# Patient Record
Sex: FEMALE | Race: WHITE | NOT HISPANIC OR LATINO | Employment: FULL TIME | URBAN - METROPOLITAN AREA
[De-identification: names, ages, dates, MRNs, and addresses within clinical notes are randomized per-mention and may not be internally consistent; named-entity substitution may affect disease eponyms.]

---

## 2018-01-04 ENCOUNTER — CONVERSION ENCOUNTER (OUTPATIENT)
Dept: MAMMOGRAPHY | Facility: HOSPITAL | Age: 43
End: 2018-01-04

## 2018-02-01 ENCOUNTER — OFFICE VISIT CONVERTED (OUTPATIENT)
Dept: ORTHOPEDIC SURGERY | Facility: CLINIC | Age: 43
End: 2018-02-01
Attending: ORTHOPAEDIC SURGERY

## 2018-02-12 ENCOUNTER — OFFICE VISIT CONVERTED (OUTPATIENT)
Dept: ORTHOPEDIC SURGERY | Facility: CLINIC | Age: 43
End: 2018-02-12
Attending: ORTHOPAEDIC SURGERY

## 2018-06-11 ENCOUNTER — OFFICE VISIT CONVERTED (OUTPATIENT)
Dept: ORTHOPEDIC SURGERY | Facility: CLINIC | Age: 43
End: 2018-06-11
Attending: ORTHOPAEDIC SURGERY

## 2018-07-23 ENCOUNTER — OFFICE VISIT CONVERTED (OUTPATIENT)
Dept: ORTHOPEDIC SURGERY | Facility: CLINIC | Age: 43
End: 2018-07-23
Attending: ORTHOPAEDIC SURGERY

## 2019-01-11 ENCOUNTER — HOSPITAL ENCOUNTER (OUTPATIENT)
Dept: GENERAL RADIOLOGY | Facility: HOSPITAL | Age: 44
Discharge: HOME | End: 2019-01-11
Attending: OBSTETRICS & GYNECOLOGY

## 2020-01-22 ENCOUNTER — HOSPITAL ENCOUNTER (OUTPATIENT)
Dept: GENERAL RADIOLOGY | Facility: HOSPITAL | Age: 45
Discharge: HOME | End: 2020-01-22
Attending: OBSTETRICS & GYNECOLOGY

## 2021-03-22 ENCOUNTER — HOSPITAL ENCOUNTER (OUTPATIENT)
Dept: GENERAL RADIOLOGY | Facility: HOSPITAL | Age: 46
Discharge: HOME | End: 2021-03-22
Attending: OBSTETRICS & GYNECOLOGY

## 2021-05-16 VITALS — BODY MASS INDEX: 23.86 KG/M2 | HEART RATE: 75 BPM | HEIGHT: 67 IN | WEIGHT: 152 LBS | OXYGEN SATURATION: 98 %

## 2021-05-16 VITALS — BODY MASS INDEX: 23.91 KG/M2 | WEIGHT: 152.37 LBS | OXYGEN SATURATION: 98 % | HEIGHT: 67 IN | HEART RATE: 77 BPM

## 2021-05-16 VITALS — OXYGEN SATURATION: 99 % | HEIGHT: 67 IN | WEIGHT: 152.25 LBS | BODY MASS INDEX: 23.9 KG/M2 | HEART RATE: 60 BPM

## 2021-05-16 VITALS — BODY MASS INDEX: 23.86 KG/M2 | HEIGHT: 67 IN | WEIGHT: 152 LBS | HEART RATE: 93 BPM | OXYGEN SATURATION: 98 %

## 2022-03-01 ENCOUNTER — TELEPHONE (OUTPATIENT)
Dept: OBSTETRICS AND GYNECOLOGY | Facility: CLINIC | Age: 47
End: 2022-03-01

## 2022-03-01 DIAGNOSIS — Z12.31 BREAST CANCER SCREENING BY MAMMOGRAM: Primary | ICD-10-CM

## 2022-04-08 RX ORDER — ESTROGENS, CONJUGATED 0.9 MG
TABLET ORAL
Qty: 30 TABLET | Refills: 1 | Status: SHIPPED | OUTPATIENT
Start: 2022-04-08 | End: 2022-05-24 | Stop reason: SDUPTHER

## 2022-04-08 RX ORDER — VALACYCLOVIR HYDROCHLORIDE 1 G/1
TABLET, FILM COATED ORAL
Qty: 30 TABLET | Refills: 1 | Status: SHIPPED | OUTPATIENT
Start: 2022-04-08 | End: 2022-05-24 | Stop reason: SDUPTHER

## 2022-04-28 ENCOUNTER — HOSPITAL ENCOUNTER (OUTPATIENT)
Dept: MAMMOGRAPHY | Facility: HOSPITAL | Age: 47
Discharge: HOME | End: 2022-04-28
Admitting: OBSTETRICS & GYNECOLOGY

## 2022-04-28 DIAGNOSIS — Z12.31 BREAST CANCER SCREENING BY MAMMOGRAM: ICD-10-CM

## 2022-04-28 PROCEDURE — 77067 SCR MAMMO BI INCL CAD: CPT

## 2022-04-28 PROCEDURE — 77063 BREAST TOMOSYNTHESIS BI: CPT

## 2022-05-23 NOTE — PROGRESS NOTES
"Well Woman Visit    CC: Scheduled annual well gyn visit  Chief Complaint   Patient presents with   • Gynecologic Exam       Myriad intake in the past?: Yes    Previously Qualified? NO Changes in Family Hx since? NO    using HRT, s/p HYST BSO, benign indications uses Premarin 0.9 MG tablet    HPI:   46 y.o. Doing well w premarin, still w occas night sweats, wants to continue.    Pain:  None     MMG 2022 cat I.    PCP: does not have PCP  History: PMHx, Meds, Allergies, PSHx, Social Hx, and POBHx all reviewed and updated.        PHYSICAL EXAM:  /79   Pulse 83   Ht 165.1 cm (65\")   Wt 79.4 kg (175 lb)   BMI 29.12 kg/m²  Not found.  General- NAD, alert and oriented, appropriate  Psych- Normal mood, good memory  Neck- No masses, no thyroid enlargement  CV- Regular rhythm, no murnurs  Resp- CTA to bases, no wheezes  Abdomen- Soft, non distended, non tender, no masses    Breast left-  Bilaterally symmetrical, no masses, non tender, no nipple discharge  Breast right- Bilaterally symmetrical, no masses, non tender, no nipple discharge    External genitalia- Normal female, no lesions  Urethra/meatus- Normal, no masses, non tender  Bladder- Normal, no masses, non tender  Vagina- Normal, mild atrophy, no lesions, no discharge.  Prolapse: No prolapse  Cvx- Absent  Uterus- Absent  Adnexa- Absent, NT, no mass  Anus/Rectum/Perineum- Normal appearance, no mass, good sphincter tone, no hemorrhoids, no prolapse    Lymphatic- No palpable neck, axillary, or groin nodes  Ext- No edema, no cyanosis    Skin- No lesions, no rashes, no acanthosis nigricans      ASSESSMENT and PLAN:    Diagnoses and all orders for this visit:    1. Well woman exam (Primary)  -     CBC (No Diff)  -     Comprehensive Metabolic Panel  -     Hemoglobin A1c  -     Lipid Panel  -     TSH    2. Postmenopausal  -     estrogens, conjugated, (PREMARIN) 0.9 MG tablet; Take 1 tablet by mouth Daily.  Dispense: 90 tablet; Refill: 4    3. 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 Darling, JUST MONITOR EXTERNALLY,  colpo not needed any more, RTO for any noted lesins    4. 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 Herpes   -     valACYclovir (VALTREX) 1000 MG tablet; Take 1 tablet by mouth Daily.  Dispense: 90 tablet; Refill: 4    Preventative:  • BREAST HEALTH- Monthly self breast exam importance and how to reviewed. MMG and/or MRI (prn) reviewed per society guidelines and her individual history. Screen: Already up to date  • CERVICAL CANCER Screening- Reviewed current ASCCP guidelines for screening w and wo cotest HPV, age specific.  Screen: Not medically needed  • COLON CANCER Screening- Reviewed current medical society guidelines and options.  Screen:  Pt will call to schedule  • Importance of WEIGHT MANAGEMENT, nutrition, and exercise reviewed  • BONE HEALTH- Reviewed current medical society guidelines and options for testing, calcium and vit D intake.  Weight bearing exercise.  DEXA: Not medically needed  • VACCINATIONS Recommended: COVID and booster PRN, Flu annually, Tdap [id number].  Importance discussed, risk being unvaccinated reviewed.  Questions answered  • SMOKING STATUS- pt does use nicotine and/or tobacco.  I reviewed importance of avoiding.  Recommend FU w PCP regarding quitting options if unable to quit wo assistance.  Does NOT qualify- age <50.    HRT- I recommend the lowest dose possible, for the shortest period of time.  Risks HRT NLT breast CA (progestin), CVA, MI, SHIREEN.     Importance of monitoring vulva for lesions and FU PRN noting any visible lesions.     She understands the importance of having any ordered tests to be performed in a timely fashion.  The risks of not performing them include, but are not limited to, advanced cancer stages, bone loss from osteoporosis and/or subsequent increase in morbidity and/or  mortality.  She is encouraged to review her results online and/or contact or office if she has questions.     Follow Up:  Return in about 1 year (around 5/24/2023) for WWE.            Liliana Rocha,   05/24/2022    [facility] [facility]  [address] [place] [address]  Dept: [phone]  Dept Fax: [phone]  Loc: [phone]  Loc Fax: [phone]

## 2022-05-24 ENCOUNTER — OFFICE VISIT (OUTPATIENT)
Dept: OBSTETRICS AND GYNECOLOGY | Facility: CLINIC | Age: 47
End: 2022-05-24

## 2022-05-24 VITALS
WEIGHT: 175 LBS | HEART RATE: 83 BPM | BODY MASS INDEX: 29.16 KG/M2 | DIASTOLIC BLOOD PRESSURE: 79 MMHG | SYSTOLIC BLOOD PRESSURE: 132 MMHG | HEIGHT: 65 IN

## 2022-05-24 DIAGNOSIS — D07.1 VIN III (VULVAR INTRAEPITHELIAL NEOPLASIA III): ICD-10-CM

## 2022-05-24 DIAGNOSIS — B00.9 HERPES: ICD-10-CM

## 2022-05-24 DIAGNOSIS — N89.0 VAIN I (VAGINAL INTRAEPITHELIAL NEOPLASIA GRADE I): ICD-10-CM

## 2022-05-24 DIAGNOSIS — Z01.419 WELL WOMAN EXAM: Primary | ICD-10-CM

## 2022-05-24 DIAGNOSIS — Z78.0 POSTMENOPAUSAL: ICD-10-CM

## 2022-05-24 LAB
ALBUMIN SERPL-MCNC: 4.1 G/DL (ref 3.5–5.2)
ALBUMIN/GLOB SERPL: 1.3 G/DL
ALP SERPL-CCNC: 63 U/L (ref 39–117)
ALT SERPL W P-5'-P-CCNC: 10 U/L (ref 1–33)
ANION GAP SERPL CALCULATED.3IONS-SCNC: 11.1 MMOL/L (ref 5–15)
AST SERPL-CCNC: 16 U/L (ref 1–32)
BILIRUB SERPL-MCNC: 0.3 MG/DL (ref 0–1.2)
BUN SERPL-MCNC: 9 MG/DL (ref 6–20)
BUN/CREAT SERPL: 12.7 (ref 7–25)
CALCIUM SPEC-SCNC: 9.5 MG/DL (ref 8.6–10.5)
CHLORIDE SERPL-SCNC: 104 MMOL/L (ref 98–107)
CHOLEST SERPL-MCNC: 218 MG/DL (ref 0–200)
CO2 SERPL-SCNC: 22.9 MMOL/L (ref 22–29)
CREAT SERPL-MCNC: 0.71 MG/DL (ref 0.57–1)
DEPRECATED RDW RBC AUTO: 42.9 FL (ref 37–54)
EGFRCR SERPLBLD CKD-EPI 2021: 105.7 ML/MIN/1.73
ERYTHROCYTE [DISTWIDTH] IN BLOOD BY AUTOMATED COUNT: 12.5 % (ref 12.3–15.4)
GLOBULIN UR ELPH-MCNC: 3.2 GM/DL
GLUCOSE SERPL-MCNC: 102 MG/DL (ref 65–99)
HBA1C MFR BLD: 5.3 % (ref 4.8–5.6)
HCT VFR BLD AUTO: 41.6 % (ref 34–46.6)
HDLC SERPL-MCNC: 60 MG/DL (ref 40–60)
HGB BLD-MCNC: 14.2 G/DL (ref 12–15.9)
LDLC SERPL CALC-MCNC: 130 MG/DL (ref 0–100)
LDLC/HDLC SERPL: 2.1 {RATIO}
MCH RBC QN AUTO: 32.1 PG (ref 26.6–33)
MCHC RBC AUTO-ENTMCNC: 34.1 G/DL (ref 31.5–35.7)
MCV RBC AUTO: 93.9 FL (ref 79–97)
PLATELET # BLD AUTO: 305 10*3/MM3 (ref 140–450)
PMV BLD AUTO: 11.2 FL (ref 6–12)
POTASSIUM SERPL-SCNC: 4 MMOL/L (ref 3.5–5.2)
PROT SERPL-MCNC: 7.3 G/DL (ref 6–8.5)
RBC # BLD AUTO: 4.43 10*6/MM3 (ref 3.77–5.28)
SODIUM SERPL-SCNC: 138 MMOL/L (ref 136–145)
TRIGL SERPL-MCNC: 159 MG/DL (ref 0–150)
TSH SERPL DL<=0.05 MIU/L-ACNC: 3.75 UIU/ML (ref 0.27–4.2)
VLDLC SERPL-MCNC: 28 MG/DL (ref 5–40)
WBC NRBC COR # BLD: 12.13 10*3/MM3 (ref 3.4–10.8)

## 2022-05-24 PROCEDURE — 83036 HEMOGLOBIN GLYCOSYLATED A1C: CPT | Performed by: OBSTETRICS & GYNECOLOGY

## 2022-05-24 PROCEDURE — 80050 GENERAL HEALTH PANEL: CPT | Performed by: OBSTETRICS & GYNECOLOGY

## 2022-05-24 PROCEDURE — 80061 LIPID PANEL: CPT | Performed by: OBSTETRICS & GYNECOLOGY

## 2022-05-24 PROCEDURE — 99396 PREV VISIT EST AGE 40-64: CPT | Performed by: OBSTETRICS & GYNECOLOGY

## 2022-05-24 RX ORDER — VALACYCLOVIR HYDROCHLORIDE 1 G/1
1000 TABLET, FILM COATED ORAL DAILY
Qty: 90 TABLET | Refills: 4 | Status: SHIPPED | OUTPATIENT
Start: 2022-05-24 | End: 2022-06-01

## 2022-05-24 RX ORDER — CLONAZEPAM 1 MG/1
1 TABLET ORAL
COMMUNITY

## 2022-05-24 RX ORDER — VALACYCLOVIR HYDROCHLORIDE 1 G/1
TABLET, FILM COATED ORAL
COMMUNITY
End: 2022-05-24 | Stop reason: SDUPTHER

## 2022-06-01 DIAGNOSIS — Z78.0 POSTMENOPAUSAL: ICD-10-CM

## 2022-06-01 RX ORDER — VALACYCLOVIR HYDROCHLORIDE 1 G/1
TABLET, FILM COATED ORAL
Qty: 90 TABLET | Refills: 4 | Status: SHIPPED | OUTPATIENT
Start: 2022-06-01

## 2023-05-02 ENCOUNTER — TRANSCRIBE ORDERS (OUTPATIENT)
Dept: ADMINISTRATIVE | Facility: HOSPITAL | Age: 48
End: 2023-05-02
Payer: COMMERCIAL

## 2023-05-02 DIAGNOSIS — Z12.31 SCREENING MAMMOGRAM FOR BREAST CANCER: Primary | ICD-10-CM

## 2023-05-26 NOTE — PROGRESS NOTES
"Well Woman Visit    CC: Scheduled annual well gyn visit  Chief Complaint   Patient presents with   • Gynecologic Exam       Prior Myriad or Hereditary Cancer testing (attach copy of result if yes): no, did not qualify     HPI:   47 y.o.   Social History     Substance and Sexual Activity   Sexual Activity Yes   • Partners: Male   • Birth control/protection: Hysterectomy     Prior VARGAS/BSO , Dr. Cuevas for benign indications.  No Pap smears needed.  History of VAIN 1-no Pap smears needed per GYN oncology.  History of VERN 2-3 status post partial vulvectomy, GYN ONC.  Has completed colposcopic follow-up.  Follow-up only as needed any noted lesions.      Pt has not noted any vulvar lesions.  No vag dc, itch or odor.  No VB.    Has mammogram scheduled in     , occas night sweats.  Taking premarin 0.9, doing well wants to continue.    Leak urine w cough sneeze.  No constant dribble.  A lot of urinary urgency w leak also.  Sylvie 4am.  Nocturia 1-2x.  No dysuria. No vag dryness.    PCP: does manage PMHx and preventative labs  History: PMHx, Meds, Allergies, PSHx, Social Hx, and POBHx all reviewed and updated.    PHYSICAL EXAM:  /78   Pulse 80   Ht 165.1 cm (65\")   Wt 78.7 kg (173 lb 6.4 oz)   BMI 28.86 kg/m²  Not found.  General- NAD, alert and oriented, appropriate  Psych- Normal mood, good memory  Neck- No masses, no thyroid enlargement  CV- Regular rhythm, no murnurs  Resp- CTA to bases, no wheezes  Abdomen- Soft, non distended, non tender, no masses    Breast left-  Bilaterally symmetrical, no masses, non tender, no nipple discharge, no axillary or supraclavicular nodes palpable  Breast right- Bilaterally symmetrical, no masses, non tender, no nipple discharge, no axillary or supraclavicular nodes palpable    External genitalia- Normal female, no lesions  Urethra/meatus- Normal, no masses, non tender  Bladder- Normal, no masses, non tender  Vagina- Normal, no atrophy, no lesions, no discharge. "  Prolapse: Split speculum exam performed and tested with valsalva, Cystocele, Rectocele, Paravaginal defect: Cystocele to introitus, bilat equal paravag defects, rectocele very mild  Cvx- Absent  Uterus- Absent  Adnexa- Absent  Anus/Rectum/Perineum- Normal appearance, no mass, good sphincter tone, no hemorrhoids, no prolapse    Lymphatic- No palpable neck, axillary, or groin nodes  Ext- No edema, no cyanosis    Skin- lesion chest 4mm slightly pearly margins, no rashes, no acanthosis nigricans      ASSESSMENT and PLAN:    Diagnoses and all orders for this visit:    1. Well woman exam (Primary)  -     Cancel: IgP, Aptima HPV    2. Postmenopausal  -     estrogens, conjugated, (PREMARIN) 0.9 MG tablet; Take 1 tablet by mouth Daily.  Dispense: 90 tablet; Refill: 4    3. Screen for colon cancer  -     Ambulatory Referral to General Surgery    4. Hx of Herpes  -     valACYclovir (VALTREX) 1000 MG tablet; Take 1 tablet by mouth Daily.  Dispense: 90 tablet; Refill: 4    5. 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 Hx VERN II-III (vulvar intraepithelial neoplasia III)  Overview:  Partial vulvectomy, GYN ONC, Dr. Darling, JUST MONITOR EXTERNALLY,  colpo not needed any more, RTO for any noted lesins      7. Urinary urgency  -     Urinalysis With Microscopic - Urine, Clean Catch  -     Urine Culture - Urine, Urine, Clean Catch  -     tolterodine LA (Detrol LA) 2 MG 24 hr capsule; Take 1 capsule by mouth Daily.  Dispense: 30 capsule; Refill: 12    8. Mixed stress and urge urinary incontinence  Comments:  Discussed all options, declines pessary or UROGYN, will start kegels  Overview:  w cystocele/ paravag defects    Orders:  -     tolterodine LA (Detrol LA) 2 MG 24 hr capsule; Take 1 capsule by mouth Daily.  Dispense: 30 capsule; Refill: 12    9. Cystocele, paravag defects, rectocele    10. Skin lesion  Comments:  discussd DDX includes malignancy, rec  eval PCP and strongly consider derm eval/biopsy.  Pt will dw her PCP.        Preventative:  • BREAST HEALTH- Monthly self breast exam importance and how to reviewed. MMG and/or MRI (prn) reviewed per society guidelines and her individual history. Screen: Already up to date  • CERVICAL CANCER Screening- Reviewed current ASCCP guidelines for screening w and wo cotest HPV, age specific.  Screen: Not medically needed  • COLON CANCER Screening- Reviewed current medical society guidelines and options.  Screen:  Updated today  • Importance of WEIGHT MANAGEMENT, nutrition, and exercise reviewed  • BONE HEALTH- Reviewed current medical society guidelines and options for testing, calcium and vit D intake.  Weight bearing exercise.  DEXA: Not medically needed  • VACCINATIONS Recommended: COVID and booster PRN, Flu annually, Tdap [id number].  Importance discussed, risk being unvaccinated reviewed.  Questions answered  • SMOKING STATUS- pt does use nicotine and/or tobacco.  I reviewed importance of avoiding.  Recommend FU w PCP regarding quitting options if unable to quit wo assistance.  Does not qualify (age 50-80, 20pack yr hx, current or former smoker, quit w/in last 15yrs, no symptoms of lung CA) for low dose CT of chest.  If qualifies, I recommend pt FU w PCP to schedule/monitor screening for lung CA.  We discussed smoking and risk vag/vulvar malignancy.  Pt is not ready to quit, but does plan on working w PCP to assist with quitting in the future.  • Follow up PCP/Specialist PMHx and Labs    HRT- I recommend the lowest dose possible, for the shortest period of time.  Risks HRT NLT breast CA (progestin), CVA, MI, SHIREEN.      She understands the importance of having any ordered tests to be performed in a timely fashion.  The risks of not performing them include, but are not limited to, advanced cancer stages, bone loss from osteoporosis and/or subsequent increase in morbidity and/or mortality.  She is encouraged to review her  results online and/or contact or office if she has questions.     Follow Up:  Return in about 1 year (around 5/30/2024) for WWE sooner prn desiring pessary or other options for CHINEDU.            Liliana Rocha, DO  05/30/2023    [facility] [place] [facility]  [address] [place] [address]  Dept: [phone]  Dept Fax: [phone]  Loc: [phone]  Loc Fax: [phone]

## 2023-05-30 ENCOUNTER — OFFICE VISIT (OUTPATIENT)
Dept: OBSTETRICS AND GYNECOLOGY | Facility: CLINIC | Age: 48
End: 2023-05-30

## 2023-05-30 VITALS
BODY MASS INDEX: 28.89 KG/M2 | HEIGHT: 65 IN | SYSTOLIC BLOOD PRESSURE: 121 MMHG | WEIGHT: 173.4 LBS | HEART RATE: 80 BPM | DIASTOLIC BLOOD PRESSURE: 78 MMHG

## 2023-05-30 DIAGNOSIS — N39.46 MIXED STRESS AND URGE URINARY INCONTINENCE: ICD-10-CM

## 2023-05-30 DIAGNOSIS — N89.0 VAIN I (VAGINAL INTRAEPITHELIAL NEOPLASIA GRADE I): ICD-10-CM

## 2023-05-30 DIAGNOSIS — R39.15 URINARY URGENCY: ICD-10-CM

## 2023-05-30 DIAGNOSIS — L98.9 SKIN LESION: ICD-10-CM

## 2023-05-30 DIAGNOSIS — Z01.419 WELL WOMAN EXAM: Primary | ICD-10-CM

## 2023-05-30 DIAGNOSIS — B00.9 HERPES: ICD-10-CM

## 2023-05-30 DIAGNOSIS — Z78.0 POSTMENOPAUSAL: ICD-10-CM

## 2023-05-30 DIAGNOSIS — D07.1 VIN III (VULVAR INTRAEPITHELIAL NEOPLASIA III): ICD-10-CM

## 2023-05-30 DIAGNOSIS — Z12.11 SCREEN FOR COLON CANCER: ICD-10-CM

## 2023-05-30 DIAGNOSIS — N81.4 CYSTOCELE WITH PROLAPSE: ICD-10-CM

## 2023-05-30 LAB
BACTERIA UR QL AUTO: NORMAL /HPF
BILIRUB UR QL STRIP: NEGATIVE
CLARITY UR: CLEAR
COLOR UR: YELLOW
GLUCOSE UR STRIP-MCNC: NEGATIVE MG/DL
HGB UR QL STRIP.AUTO: NEGATIVE
HYALINE CASTS UR QL AUTO: NORMAL /LPF
KETONES UR QL STRIP: NEGATIVE
LEUKOCYTE ESTERASE UR QL STRIP.AUTO: NEGATIVE
NITRITE UR QL STRIP: NEGATIVE
PH UR STRIP.AUTO: 6.5 [PH] (ref 5–8)
PROT UR QL STRIP: NEGATIVE
RBC # UR STRIP: NORMAL /HPF
REF LAB TEST METHOD: NORMAL
SP GR UR STRIP: 1.01 (ref 1–1.03)
SQUAMOUS #/AREA URNS HPF: NORMAL /HPF
UROBILINOGEN UR QL STRIP: NORMAL
WBC # UR STRIP: NORMAL /HPF

## 2023-05-30 PROCEDURE — 81001 URINALYSIS AUTO W/SCOPE: CPT | Performed by: OBSTETRICS & GYNECOLOGY

## 2023-05-30 PROCEDURE — 87086 URINE CULTURE/COLONY COUNT: CPT | Performed by: OBSTETRICS & GYNECOLOGY

## 2023-05-30 RX ORDER — VALACYCLOVIR HYDROCHLORIDE 1 G/1
1000 TABLET, FILM COATED ORAL DAILY
Qty: 90 TABLET | Refills: 4 | Status: SHIPPED | OUTPATIENT
Start: 2023-05-30

## 2023-05-30 RX ORDER — TOLTERODINE 2 MG/1
2 CAPSULE, EXTENDED RELEASE ORAL DAILY
Qty: 30 CAPSULE | Refills: 12 | Status: SHIPPED | OUTPATIENT
Start: 2023-05-30

## 2023-05-31 DIAGNOSIS — Z78.0 POSTMENOPAUSAL: ICD-10-CM

## 2023-05-31 LAB — BACTERIA SPEC AEROBE CULT: NO GROWTH

## 2023-05-31 RX ORDER — ESTROGENS, CONJUGATED 0.9 MG
TABLET ORAL
Qty: 90 TABLET | Refills: 4 | OUTPATIENT
Start: 2023-05-31

## 2023-05-31 NOTE — TELEPHONE ENCOUNTER
Denied refill on Premarin.  Last seen 5/30/23.  Last filled 5/30/23 Premarin #90 with 4 refills.  Next appointment 5/31/24

## 2023-06-01 DIAGNOSIS — B00.9 HERPES: ICD-10-CM

## 2023-06-01 RX ORDER — VALACYCLOVIR HYDROCHLORIDE 1 G/1
TABLET, FILM COATED ORAL
Qty: 30 TABLET | OUTPATIENT
Start: 2023-06-01

## 2023-06-05 ENCOUNTER — HOSPITAL ENCOUNTER (OUTPATIENT)
Dept: MAMMOGRAPHY | Facility: HOSPITAL | Age: 48
Discharge: HOME | End: 2023-06-05
Admitting: OBSTETRICS & GYNECOLOGY
Payer: COMMERCIAL

## 2023-06-05 DIAGNOSIS — Z12.31 SCREENING MAMMOGRAM FOR BREAST CANCER: ICD-10-CM

## 2023-06-05 PROCEDURE — 77067 SCR MAMMO BI INCL CAD: CPT

## 2023-06-05 PROCEDURE — 77063 BREAST TOMOSYNTHESIS BI: CPT

## 2023-11-27 ENCOUNTER — PREP FOR SURGERY (OUTPATIENT)
Dept: OTHER | Facility: HOSPITAL | Age: 48
End: 2023-11-27
Payer: COMMERCIAL

## 2023-11-27 ENCOUNTER — OFFICE VISIT (OUTPATIENT)
Dept: SURGERY | Facility: CLINIC | Age: 48
End: 2023-11-27
Payer: COMMERCIAL

## 2023-11-27 VITALS
SYSTOLIC BLOOD PRESSURE: 131 MMHG | WEIGHT: 172 LBS | BODY MASS INDEX: 28.66 KG/M2 | HEART RATE: 82 BPM | HEIGHT: 65 IN | DIASTOLIC BLOOD PRESSURE: 80 MMHG

## 2023-11-27 DIAGNOSIS — R10.12 LUQ PAIN: Primary | ICD-10-CM

## 2023-11-27 DIAGNOSIS — K59.09 OTHER CONSTIPATION: ICD-10-CM

## 2023-11-27 DIAGNOSIS — K59.00 CONSTIPATION, UNSPECIFIED CONSTIPATION TYPE: ICD-10-CM

## 2023-11-27 PROCEDURE — 99203 OFFICE O/P NEW LOW 30 MIN: CPT | Performed by: NURSE PRACTITIONER

## 2023-11-27 RX ORDER — SODIUM CHLORIDE 9 MG/ML
40 INJECTION, SOLUTION INTRAVENOUS AS NEEDED
Status: CANCELLED | OUTPATIENT
Start: 2023-11-27

## 2023-11-27 RX ORDER — SODIUM CHLORIDE 0.9 % (FLUSH) 0.9 %
3 SYRINGE (ML) INJECTION EVERY 12 HOURS SCHEDULED
Status: CANCELLED | OUTPATIENT
Start: 2023-11-27

## 2023-11-27 RX ORDER — SODIUM CHLORIDE 0.9 % (FLUSH) 0.9 %
10 SYRINGE (ML) INJECTION AS NEEDED
Status: CANCELLED | OUTPATIENT
Start: 2023-11-27

## 2023-11-27 RX ORDER — DOCUSATE SODIUM 100 MG/1
CAPSULE, LIQUID FILLED ORAL
COMMUNITY
Start: 1995-06-24

## 2023-11-27 RX ORDER — SODIUM, POTASSIUM,MAG SULFATES 17.5-3.13G
SOLUTION, RECONSTITUTED, ORAL ORAL
Qty: 354 ML | Refills: 0 | Status: ON HOLD | OUTPATIENT
Start: 2023-11-27 | End: 2023-11-29

## 2023-11-27 RX ORDER — IBUPROFEN 800 MG/1
800 TABLET ORAL DAILY
COMMUNITY

## 2023-11-27 NOTE — H&P (VIEW-ONLY)
Chief Complaint: Colonoscopy (consult)    Subjective      Colonoscopy consultation       History of Present Illness  Matt Steele is a 48 y.o. female presents to [facility] for colonoscopy consultation.    Patient presents today on referral from Dr. Liliana Bright for colonoscopy consultation.  Patient reports that she is having left upper quadrant pain intermittently x 2 years.  The patient reports that the pain has increased over the last year.  She does admit to constipation issues.  She reports that she has had constipation issues for years.  Denies any rectal bleeding.  Denies any change in bowel habit.  Denies any family history of colorectal cancer.  No previous colonoscopy.    Patient denies YUE.  Denies any cardiac issues.  Denies taking a GLP-1 receptors.    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 Darling, JUST MONITOR EXTERNALLY,  colpo not needed any more, RTO for any noted lesins       Past Surgical History:   Procedure Laterality Date    HYSTERECTOMY  2002    [facility] BSO, Dr. Cuevas, benign indications    SALPINGECTOMY      TONSILLECTOMY      TUBAL ABDOMINAL LIGATION      VULVA SURGERY Left     Partial vulvectomy, GYN ONC, Dr. Darling    WISDOM TOOTH EXTRACTION         Outpatient Medications Marked as Taking for the 11/27/23 encounter (Office Visit) with Corinne Naylor APRN   Medication Sig Dispense Refill    docusate sodium (Stool Softener) 100 MG capsule       estrogens, conjugated, (PREMARIN) 0.9 MG tablet Take 1 tablet by mouth Daily. 90 tablet 4    ibuprofen (ADVIL,MOTRIN) 800 MG tablet Take 1 tablet by mouth Daily.      Probiotic Product (PROBIOTIC PO) Take  by mouth Daily.      tolterodine LA (Detrol LA) 2 MG 24 hr  "capsule Take 1 capsule by mouth Daily. 30 capsule 12    valACYclovir (VALTREX) 1000 MG tablet Take 1 tablet by mouth Daily. 90 tablet 4       No Known Allergies     Family History   Problem Relation Age of Onset    Lung cancer Mother     Lung cancer Maternal Grandmother     Liver cancer Maternal Grandmother     Breast cancer Neg Hx     Ovarian cancer Neg Hx     Uterine cancer Neg Hx     Colon cancer Neg Hx     Deep vein thrombosis Neg Hx     Pulmonary embolism Neg Hx        Social History     Socioeconomic History    Marital status:    Tobacco Use    Smoking status: Every Day     Packs/day: 1.50     Years: 25.00     Additional pack years: 0.00     Total pack years: 37.50     Types: Cigarettes    Smokeless tobacco: Never   Vaping Use    Vaping Use: Never used   Substance and Sexual Activity    Alcohol use: Yes     Comment: very rarely    Drug use: Never    Sexual activity: Yes     Partners: Male     Birth control/protection: Hysterectomy       Review of Systems   Constitutional:  Negative for chills and fever.   Gastrointestinal:  Positive for abdominal pain and constipation. Negative for abdominal distention, anal bleeding, blood in stool, diarrhea and rectal pain.        Vital Signs:   /80 (BP Location: Right arm)   Pulse 82   Ht 165.1 cm (65\")   Wt 78 kg (172 lb)   BMI 28.62 kg/m²      Physical Exam  Vitals and nursing note reviewed.   Constitutional:       General: She is not in acute distress.     Appearance: Normal appearance.   HENT:      Head: Normocephalic.   Cardiovascular:      Rate and Rhythm: Normal rate.   Pulmonary:      Effort: Pulmonary effort is normal.      Breath sounds: No stridor.   Abdominal:      Palpations: Abdomen is soft.      Tenderness: There is abdominal tenderness. There is no guarding.   Musculoskeletal:         General: No deformity. Normal range of motion.   Skin:     General: Skin is warm and dry.      Coloration: Skin is not jaundiced.   Neurological:      General: " No focal deficit present.      Mental Status: She is alert and oriented to person, place, and time.   Psychiatric:         Mood and Affect: Mood normal.         Thought Content: Thought content normal.          Result Review :          []  Laboratory  []  Radiology  []  Pathology  []  Microbiology  []  EKG/Telemetry   []  Cardiology/Vascular   []  Old records  I spent 15 minutes caring for Matt on this date of service. This time includes time spent by me in the following activities: reviewing tests, obtaining and/or reviewing a separately obtained history, performing a medically appropriate examination and/or evaluation, ordering medications, tests, or procedures, and documenting information in the medical record.     Assessment and Plan    Diagnoses and all orders for this visit:    1. LUQ pain (Primary)    2. Constipation, unspecified constipation type    Other orders  -     sodium-potassium-magnesium sulfates (Suprep Bowel Prep Kit) 17.5-3.13-1.6 GM/177ML solution oral solution; Take as directed.  Instructions given in office.  Dispense: 2 bottles  Dispense: 354 mL; Refill: 0    N.p.o. 2 hours prior to the procedure      Follow Up   Return for Schedule colonoscopy with Dr. Valdes on 11/29/2023 at [facility].    Hospital arrival time: 10:30.    Possible risks/complications, benefits, and alternatives to surgical or invasive procedures have been explained to patient and/or legal guardian.    Patient has been evaluated and can tolerate anesthesia and/or sedation. Risks, benefits, and alternatives to anesthesia and sedation have been explained to the patient and/or legal guardian. Patient verbalizes understanding and is willing to proceed with the above plan.     Patient was given instructions and counseling regarding her condition or for health maintenance advice. Please see specific information pulled into the AVS if appropriate.

## 2023-11-27 NOTE — PROGRESS NOTES
Chief Complaint: Colonoscopy (consult)    Subjective      Colonoscopy consultation       History of Present Illness  Matt Steele is a 48 y.o. female presents to [facility] for colonoscopy consultation.    Patient presents today on referral from Dr. Liliana Bright for colonoscopy consultation.  Patient reports that she is having left upper quadrant pain intermittently x 2 years.  The patient reports that the pain has increased over the last year.  She does admit to constipation issues.  She reports that she has had constipation issues for years.  Denies any rectal bleeding.  Denies any change in bowel habit.  Denies any family history of colorectal cancer.  No previous colonoscopy.    Patient denies YUE.  Denies any cardiac issues.  Denies taking a GLP-1 receptors.    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 Darilng, JUST MONITOR EXTERNALLY,  colpo not needed any more, RTO for any noted lesins       Past Surgical History:   Procedure Laterality Date    HYSTERECTOMY  2002    [facility] BSO, Dr. Cuevas, benign indications    SALPINGECTOMY      TONSILLECTOMY      TUBAL ABDOMINAL LIGATION      VULVA SURGERY Left     Partial vulvectomy, GYN ONC, Dr. Darling    WISDOM TOOTH EXTRACTION         Outpatient Medications Marked as Taking for the 11/27/23 encounter (Office Visit) with Corinne Naylor APRN   Medication Sig Dispense Refill    docusate sodium (Stool Softener) 100 MG capsule       estrogens, conjugated, (PREMARIN) 0.9 MG tablet Take 1 tablet by mouth Daily. 90 tablet 4    ibuprofen (ADVIL,MOTRIN) 800 MG tablet Take 1 tablet by mouth Daily.      Probiotic Product (PROBIOTIC PO) Take  by mouth Daily.      tolterodine LA (Detrol LA) 2 MG 24 hr  "capsule Take 1 capsule by mouth Daily. 30 capsule 12    valACYclovir (VALTREX) 1000 MG tablet Take 1 tablet by mouth Daily. 90 tablet 4       No Known Allergies     Family History   Problem Relation Age of Onset    Lung cancer Mother     Lung cancer Maternal Grandmother     Liver cancer Maternal Grandmother     Breast cancer Neg Hx     Ovarian cancer Neg Hx     Uterine cancer Neg Hx     Colon cancer Neg Hx     Deep vein thrombosis Neg Hx     Pulmonary embolism Neg Hx        Social History     Socioeconomic History    Marital status:    Tobacco Use    Smoking status: Every Day     Packs/day: 1.50     Years: 25.00     Additional pack years: 0.00     Total pack years: 37.50     Types: Cigarettes    Smokeless tobacco: Never   Vaping Use    Vaping Use: Never used   Substance and Sexual Activity    Alcohol use: Yes     Comment: very rarely    Drug use: Never    Sexual activity: Yes     Partners: Male     Birth control/protection: Hysterectomy       Review of Systems   Constitutional:  Negative for chills and fever.   Gastrointestinal:  Positive for abdominal pain and constipation. Negative for abdominal distention, anal bleeding, blood in stool, diarrhea and rectal pain.        Vital Signs:   /80 (BP Location: Right arm)   Pulse 82   Ht 165.1 cm (65\")   Wt 78 kg (172 lb)   BMI 28.62 kg/m²      Physical Exam  Vitals and nursing note reviewed.   Constitutional:       General: She is not in acute distress.     Appearance: Normal appearance.   HENT:      Head: Normocephalic.   Cardiovascular:      Rate and Rhythm: Normal rate.   Pulmonary:      Effort: Pulmonary effort is normal.      Breath sounds: No stridor.   Abdominal:      Palpations: Abdomen is soft.      Tenderness: There is abdominal tenderness. There is no guarding.   Musculoskeletal:         General: No deformity. Normal range of motion.   Skin:     General: Skin is warm and dry.      Coloration: Skin is not jaundiced.   Neurological:      General: " No focal deficit present.      Mental Status: She is alert and oriented to person, place, and time.   Psychiatric:         Mood and Affect: Mood normal.         Thought Content: Thought content normal.          Result Review :          []  Laboratory  []  Radiology  []  Pathology  []  Microbiology  []  EKG/Telemetry   []  Cardiology/Vascular   []  Old records  I spent 15 minutes caring for Matt on this date of service. This time includes time spent by me in the following activities: reviewing tests, obtaining and/or reviewing a separately obtained history, performing a medically appropriate examination and/or evaluation, ordering medications, tests, or procedures, and documenting information in the medical record.     Assessment and Plan    Diagnoses and all orders for this visit:    1. LUQ pain (Primary)    2. Constipation, unspecified constipation type    Other orders  -     sodium-potassium-magnesium sulfates (Suprep Bowel Prep Kit) 17.5-3.13-1.6 GM/177ML solution oral solution; Take as directed.  Instructions given in office.  Dispense: 2 bottles  Dispense: 354 mL; Refill: 0    N.p.o. 2 hours prior to the procedure      Follow Up   Return for Schedule colonoscopy with Dr. Valdes on 11/29/2023 at [facility].    Hospital arrival time: 10:30.    Possible risks/complications, benefits, and alternatives to surgical or invasive procedures have been explained to patient and/or legal guardian.    Patient has been evaluated and can tolerate anesthesia and/or sedation. Risks, benefits, and alternatives to anesthesia and sedation have been explained to the patient and/or legal guardian. Patient verbalizes understanding and is willing to proceed with the above plan.     Patient was given instructions and counseling regarding her condition or for health maintenance advice. Please see specific information pulled into the AVS if appropriate.

## 2023-11-28 ENCOUNTER — ANESTHESIA EVENT (OUTPATIENT)
Dept: GASTROENTEROLOGY | Facility: HOSPITAL | Age: 48
End: 2023-11-28
Payer: COMMERCIAL

## 2023-11-28 NOTE — ANESTHESIA PREPROCEDURE EVALUATION
Anesthesia Evaluation     Patient summary reviewed and Nursing notes reviewed   NPO Solid Status: > 8 hours  NPO Liquid Status: > 2 hours           Airway   Mallampati: II  TM distance: >3 FB  Neck ROM: full  No difficulty expected  Dental    (+) lower dentures, upper dentures and edentulous    Pulmonary - normal exam    breath sounds clear to auscultation  (+) a smoker Current, cigarettes,  Cardiovascular - negative cardio ROS and normal exam  Exercise tolerance: excellent (>7 METS)    Rhythm: regular  Rate: normal        Neuro/Psych  (+) psychiatric history Anxiety  GI/Hepatic/Renal/Endo - negative ROS     Musculoskeletal (-) negative ROS    Abdominal  - normal exam    Abdomen: soft.  Bowel sounds: normal.   Substance History      OB/GYN negative ob/gyn ROS   (-)  Pregnant        Other        ROS/Med Hx Other: Hysterectomy                Anesthesia Plan    ASA 2     MAC   total IV anesthesia  intravenous induction     Anesthetic plan, risks, benefits, and alternatives have been provided, discussed and informed consent has been obtained with: patient.    Use of blood products discussed with patient .    Plan discussed with CRNA.      CODE STATUS:

## 2023-11-28 NOTE — PRE-PROCEDURE INSTRUCTIONS
"Instructed on date and arrival time of 1000. Come to entrance \"C\". Must have  over age 18 to drive home.  May have two visitors; however, children under 12 must stay in waiting room.  Discussed clear liquid diet (no red or purple), bowel prep, and NPO.  May take medications as usual except for blood thinners, diabetic medications, and weight loss medications.  Bring list of medications.  Verbalized understanding of instructions given.  Instructed to call for questions or concerns.  "

## 2023-11-29 ENCOUNTER — HOSPITAL ENCOUNTER (OUTPATIENT)
Facility: HOSPITAL | Age: 48
Setting detail: HOSPITAL OUTPATIENT SURGERY
Discharge: HOME | End: 2023-11-29
Attending: SURGERY | Admitting: SURGERY
Payer: COMMERCIAL

## 2023-11-29 ENCOUNTER — ANESTHESIA (OUTPATIENT)
Dept: GASTROENTEROLOGY | Facility: HOSPITAL | Age: 48
End: 2023-11-29
Payer: COMMERCIAL

## 2023-11-29 VITALS
OXYGEN SATURATION: 100 % | TEMPERATURE: 99 F | HEART RATE: 65 BPM | RESPIRATION RATE: 23 BRPM | DIASTOLIC BLOOD PRESSURE: 65 MMHG | BODY MASS INDEX: 27.96 KG/M2 | SYSTOLIC BLOOD PRESSURE: 133 MMHG | WEIGHT: 167.99 LBS

## 2023-11-29 DIAGNOSIS — K59.09 OTHER CONSTIPATION: ICD-10-CM

## 2023-11-29 DIAGNOSIS — R10.12 LUQ PAIN: ICD-10-CM

## 2023-11-29 PROCEDURE — 25010000002 PROPOFOL 10 MG/ML EMULSION: Performed by: NURSE ANESTHETIST, CERTIFIED REGISTERED

## 2023-11-29 PROCEDURE — 88305 TISSUE EXAM BY PATHOLOGIST: CPT | Performed by: SURGERY

## 2023-11-29 PROCEDURE — 25810000003 LACTATED RINGERS PER 1000 ML

## 2023-11-29 RX ORDER — LIDOCAINE HYDROCHLORIDE 20 MG/ML
INJECTION, SOLUTION EPIDURAL; INFILTRATION; INTRACAUDAL; PERINEURAL AS NEEDED
Status: DISCONTINUED | OUTPATIENT
Start: 2023-11-29 | End: 2023-11-29 | Stop reason: SURG

## 2023-11-29 RX ORDER — SODIUM CHLORIDE 0.9 % (FLUSH) 0.9 %
10 SYRINGE (ML) INJECTION AS NEEDED
Status: DISCONTINUED | OUTPATIENT
Start: 2023-11-29 | End: 2023-11-29 | Stop reason: HOSPADM

## 2023-11-29 RX ORDER — SODIUM CHLORIDE, SODIUM LACTATE, POTASSIUM CHLORIDE, CALCIUM CHLORIDE 600; 310; 30; 20 MG/100ML; MG/100ML; MG/100ML; MG/100ML
30 INJECTION, SOLUTION INTRAVENOUS CONTINUOUS
Status: DISCONTINUED | OUTPATIENT
Start: 2023-11-29 | End: 2023-11-29 | Stop reason: HOSPADM

## 2023-11-29 RX ORDER — PROPOFOL 10 MG/ML
VIAL (ML) INTRAVENOUS AS NEEDED
Status: DISCONTINUED | OUTPATIENT
Start: 2023-11-29 | End: 2023-11-29 | Stop reason: SURG

## 2023-11-29 RX ORDER — SODIUM CHLORIDE 0.9 % (FLUSH) 0.9 %
3 SYRINGE (ML) INJECTION EVERY 12 HOURS SCHEDULED
Status: DISCONTINUED | OUTPATIENT
Start: 2023-11-29 | End: 2023-11-29 | Stop reason: HOSPADM

## 2023-11-29 RX ORDER — SODIUM CHLORIDE 9 MG/ML
40 INJECTION, SOLUTION INTRAVENOUS AS NEEDED
Status: DISCONTINUED | OUTPATIENT
Start: 2023-11-29 | End: 2023-11-29 | Stop reason: HOSPADM

## 2023-11-29 RX ADMIN — SODIUM CHLORIDE, POTASSIUM CHLORIDE, SODIUM LACTATE AND CALCIUM CHLORIDE: 600; 310; 30; 20 INJECTION, SOLUTION INTRAVENOUS at 11:15

## 2023-11-29 RX ADMIN — PROPOFOL 200 MCG/KG/MIN: 10 INJECTION, EMULSION INTRAVENOUS at 11:17

## 2023-11-29 RX ADMIN — PROPOFOL 100 MG: 10 INJECTION, EMULSION INTRAVENOUS at 11:17

## 2023-11-29 RX ADMIN — LIDOCAINE HYDROCHLORIDE 30 MG: 20 INJECTION, SOLUTION EPIDURAL; INFILTRATION; INTRACAUDAL; PERINEURAL at 11:17

## 2023-11-29 NOTE — ANESTHESIA POSTPROCEDURE EVALUATION
Patient: Matt Steele    Procedure Summary       Date: 11/29/23 Room / Location: [facility] ENDOSCOPY 3 / [facility] ENDOSCOPY    Anesthesia Start: 1114 Anesthesia Stop: 1137    Procedure: COLONOSCOPY WITH BIOPSIES Diagnosis:       LUQ pain      Other constipation      (LUQ pain [R10.12])      (Other constipation [K59.09])    Surgeons: Shiva Valdes MD Provider: Hardy Marshall CRNA    Anesthesia Type: MAC ASA Status: 2            Anesthesia Type: MAC    Vitals  Vitals Value Taken Time   /81 11/29/23 1144   Temp 36.7 °C (98 °F) 11/29/23 1134   Pulse 72 11/29/23 1144   Resp 21 11/29/23 1144   SpO2 99 % 11/29/23 1144           Post Anesthesia Care and Evaluation    Patient location during evaluation: PACU  Patient participation: complete - patient participated  Level of consciousness: awake  Pain scale: See nurse's notes for pain score.  Pain management: adequate    Airway patency: patent  Anesthetic complications: No anesthetic complications  PONV Status: none  Cardiovascular status: acceptable  Respiratory status: acceptable and spontaneous ventilation  Hydration status: acceptable    Comments: Patient seen and examined postoperatively; vital signs stable; SpO2 greater than or equal to 90%; cardiopulmonary status stable; nausea/vomiting adequately controlled; pain adequately controlled; no apparent anesthesia complications; patient discharged from anesthesia care when discharge criteria were met

## 2023-11-30 LAB
CYTO UR: NORMAL
LAB AP CASE REPORT: NORMAL
LAB AP CLINICAL INFORMATION: NORMAL
PATH REPORT.FINAL DX SPEC: NORMAL
PATH REPORT.GROSS SPEC: NORMAL

## 2024-05-28 ENCOUNTER — TRANSCRIBE ORDERS (OUTPATIENT)
Dept: ADMINISTRATIVE | Facility: HOSPITAL | Age: 49
End: 2024-05-28
Payer: COMMERCIAL

## 2024-05-28 DIAGNOSIS — Z12.31 SCREENING MAMMOGRAM, ENCOUNTER FOR: Primary | ICD-10-CM

## 2024-05-28 NOTE — PROGRESS NOTES
"Well Woman Visit    CC: Scheduled annual well gyn visit  Chief Complaint   Patient presents with    Gynecologic Exam       HPI:   48 y.o.   Social History     Substance and Sexual Activity   Sexual Activity Yes    Partners: Male    Birth control/protection: Hysterectomy    Comment: VARGAS/BSO , Dr. Cuevas benign indications     Office Visit with Liliana Rocha DO (2023)  Mammo Screening Digital Tomosynthesis Bilateral With CAD (2023 08:35)    Prior VARGAS/BSO , Dr. Cuevas for benign indications.  No Pap smears needed.      History of VAIN 1-no Pap smears needed per GYN oncology.    History of VERN 2-3 s/p partial vulvectomy, GYN ONC.  Has completed colposcopic follow-up.  Follow-up only as needed for any noted lesions.  Pt has not noted any lesions.     Mixed urinary incontinence, started on Detrol last year.  It helped  , Premarin 0.9mg QD.  Still w occas night sweats.  Wants to cont current dose, declines increasing.     Pt has no complaints today.    PCP: no recent preventative labs  History: PMHx, Meds, Allergies, PSHx, Social Hx, and POBHx all reviewed and updated.    PHYSICAL EXAM:  /81   Pulse 91   Ht 165.1 cm (65\")   Wt 74.6 kg (164 lb 6.4 oz)   BMI 27.36 kg/m²  Not found.   General- NAD, alert and oriented, appropriate  Psych- Normal mood, good memory  Neck- No masses, no thyroid enlargement  CV- Regular rhythm, no murmurs  Resp- CTA to bases, no wheezes  Abdomen- Soft, non distended, non tender, no masses    Chaperone present during breast and/or pelvic exam if performed.  Breast left-  Bilaterally symmetrical, no masses, non tender, no nipple discharge, no axillary or supraclavicular nodes palpable.    Breast right- Bilaterally symmetrical, no masses, non tender, no nipple discharge, no axillary or supraclavicular nodes palpable.      External genitalia- Normal female, no lesions  Urethra/meatus- Normal, no masses, non tender  Bladder- Normal, no masses, non tender  Vagina- " Normal, no atrophy, no lesions, no discharge.    Cvx- Absent  Uterus- Absent  Adnexa- Absent  Anus/Rectum/Perineum- Normal appearance, no mass, good sphincter tone, no hemorrhoids, no prolapse    Lymphatic- No palpable neck, axillary, or groin nodes  Ext- No edema, no cyanosis    Skin- No lesions, no rashes, no acanthosis nigricans      ASSESSMENT and PLAN:    Diagnoses and all orders for this visit:    1. Well woman exam (Primary)  -     Mammo Screening Digital Tomosynthesis Bilateral With CAD; Future  -     Hemoglobin A1c  -     TSH  -     Lipid Panel    2. Hormone replacement therapy (HRT)  -     estrogens, conjugated, (PREMARIN) 0.9 MG tablet; Take 1 tablet by mouth Daily.  Dispense: 90 tablet; Refill: 4    3. Cystocele, paravag defects, rectocele    4. Urinary urgency  -     tolterodine LA (Detrol LA) 2 MG 24 hr capsule; Take 1 capsule by mouth Daily.  Dispense: 90 capsule; Refill: 4    5. Mixed stress and urge urinary incontinence  Comments:  Discussed all options, declines pessary or UROGYN, will start kegels  Overview:  W urinary urgency  May 2023-normal UA, neg urine culture  Detrol    Cystocele/ paravag defects    Orders:  -     tolterodine LA (Detrol LA) 2 MG 24 hr capsule; Take 1 capsule by mouth Daily.  Dispense: 90 capsule; Refill: 4    6. 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 Hx VERN III (vulvar intraepithelial neoplasia III)  Overview:  Partial vulvectomy, GYN ONC, Dr. Darling, JUST MONITOR EXTERNALLY,  colpo not needed any more, RTO for any noted lesins      8. Hx of Herpes  -     valACYclovir (VALTREX) 1000 MG tablet; Take 1 tablet by mouth Daily.  Dispense: 90 tablet; Refill: 4    9. Elevated blood pressure reading        Preventative:  BREAST HEALTH- Monthly self breast exam importance and how to reviewed. MMG and/or MRI (prn) reviewed per society guidelines and her individual history. Screen: Updated today  CERVICAL  CANCER Screening- Reviewed current ASCCP guidelines for screening w and wo cotest HPV, age specific.  Screen: Not medically needed  COLON CANCER Screening- Reviewed current medical society guidelines and options.  Screen:  Already up to date  Importance of WEIGHT MANAGEMENT, nutrition, and exercise reviewed.  Ideal BMI discussed  BONE HEALTH- Reviewed current medical society guidelines and options for testing, calcium and vit D intake.  Weight bearing exercise.  DEXA: Not medically needed  VACCINATIONS Recommended: Covid vaccine, Flu annually, Tdap [id number], Zoster (51yo and older).  Importance discussed, risk being unvaccinated reviewed.  Questions answered  SMOKING/VAPING STATUS- pt does use nicotine and/or tobacco or vape.  I reviewed importance of avoiding.  Recommend FU w PCP regarding quitting options if unable to quit wo assistance.  1800-QUIT NOW.  Does does qualify on her birthday (age 50-80, 20pack yr hx, current or former smoker, quit w/in last 15yrs, no symptoms of lung CA) for low dose CT of chest.  If qualifies, I recommend pt FU w PCP to schedule/monitor screening for lung CA      BLOOD PRESSURE elevated today was discussed.  I recommend she monitor her BP at home.  If systolic stays persistently 135 or higher OR diastolic stays 85 or higher that she FU w her PCP.  Questions answered.  Risks discussed.  Pt agrees.    She understands the importance of having any ordered tests to be performed in a timely fashion.  The risks of not performing them include, but are not limited to, advanced cancer stages, bone loss from osteoporosis and/or subsequent increase in morbidity and/or mortality.  She is encouraged to review her results online and/or contact or office if she has questions.     Follow Up:  Return in about 1 year (around 5/31/2025) for WWE.            Liliana Rocha, DO  05/31/2024    [facility]  [address] [place] [address]  Dept:  [phone]  Dept Fax: [phone]  Loc: [phone]  Loc Fax: [phone]

## 2024-05-31 ENCOUNTER — OFFICE VISIT (OUTPATIENT)
Dept: OBSTETRICS AND GYNECOLOGY | Facility: CLINIC | Age: 49
End: 2024-05-31
Payer: COMMERCIAL

## 2024-05-31 VITALS
WEIGHT: 164.4 LBS | DIASTOLIC BLOOD PRESSURE: 81 MMHG | SYSTOLIC BLOOD PRESSURE: 142 MMHG | HEIGHT: 65 IN | BODY MASS INDEX: 27.39 KG/M2 | HEART RATE: 91 BPM

## 2024-05-31 DIAGNOSIS — Z79.890 HORMONE REPLACEMENT THERAPY (HRT): ICD-10-CM

## 2024-05-31 DIAGNOSIS — Z01.419 WELL WOMAN EXAM: Primary | ICD-10-CM

## 2024-05-31 DIAGNOSIS — R03.0 ELEVATED BLOOD PRESSURE READING: ICD-10-CM

## 2024-05-31 DIAGNOSIS — N81.4 CYSTOCELE WITH PROLAPSE: ICD-10-CM

## 2024-05-31 DIAGNOSIS — N89.0 VAIN I (VAGINAL INTRAEPITHELIAL NEOPLASIA GRADE I): ICD-10-CM

## 2024-05-31 DIAGNOSIS — R39.15 URINARY URGENCY: ICD-10-CM

## 2024-05-31 DIAGNOSIS — N39.46 MIXED STRESS AND URGE URINARY INCONTINENCE: ICD-10-CM

## 2024-05-31 DIAGNOSIS — D07.1 VIN III (VULVAR INTRAEPITHELIAL NEOPLASIA III): ICD-10-CM

## 2024-05-31 DIAGNOSIS — B00.9 HERPES: ICD-10-CM

## 2024-05-31 LAB
CHOLEST SERPL-MCNC: 213 MG/DL (ref 0–200)
HBA1C MFR BLD: 5.3 % (ref 4.8–5.6)
HDLC SERPL-MCNC: 56 MG/DL (ref 40–60)
LDLC SERPL CALC-MCNC: 138 MG/DL (ref 0–100)
LDLC/HDLC SERPL: 2.43 {RATIO}
TRIGL SERPL-MCNC: 105 MG/DL (ref 0–150)
TSH SERPL DL<=0.05 MIU/L-ACNC: 1.69 UIU/ML (ref 0.27–4.2)
VLDLC SERPL-MCNC: 19 MG/DL (ref 5–40)

## 2024-05-31 PROCEDURE — 84443 ASSAY THYROID STIM HORMONE: CPT | Performed by: OBSTETRICS & GYNECOLOGY

## 2024-05-31 PROCEDURE — 80061 LIPID PANEL: CPT | Performed by: OBSTETRICS & GYNECOLOGY

## 2024-05-31 PROCEDURE — 83036 HEMOGLOBIN GLYCOSYLATED A1C: CPT | Performed by: OBSTETRICS & GYNECOLOGY

## 2024-05-31 RX ORDER — TOLTERODINE 2 MG/1
2 CAPSULE, EXTENDED RELEASE ORAL DAILY
Qty: 90 CAPSULE | Refills: 4 | Status: SHIPPED | OUTPATIENT
Start: 2024-05-31

## 2024-05-31 RX ORDER — VALACYCLOVIR HYDROCHLORIDE 1 G/1
1000 TABLET, FILM COATED ORAL DAILY
Qty: 90 TABLET | Refills: 4 | Status: SHIPPED | OUTPATIENT
Start: 2024-05-31

## 2024-07-31 ENCOUNTER — HOSPITAL ENCOUNTER (OUTPATIENT)
Dept: MAMMOGRAPHY | Facility: HOSPITAL | Age: 49
Discharge: HOME | End: 2024-07-31
Admitting: OBSTETRICS & GYNECOLOGY
Payer: COMMERCIAL

## 2024-07-31 DIAGNOSIS — Z12.31 SCREENING MAMMOGRAM, ENCOUNTER FOR: ICD-10-CM

## 2024-07-31 PROCEDURE — 77067 SCR MAMMO BI INCL CAD: CPT

## 2024-07-31 PROCEDURE — 77063 BREAST TOMOSYNTHESIS BI: CPT

## 2024-08-27 DIAGNOSIS — Z79.890 HORMONE REPLACEMENT THERAPY (HRT): ICD-10-CM

## 2024-08-28 RX ORDER — ESTROGENS, CONJUGATED 0.9 MG
0.9 TABLET ORAL DAILY
Qty: 90 TABLET | Refills: 4 | OUTPATIENT
Start: 2024-08-28

## 2024-08-28 NOTE — TELEPHONE ENCOUNTER
Denied refill on Premarin.  Last seen 5/31/24.  Last filled 5/31/24 Premarin #90 with 4 rfills.  No appointment scheduled

## 2025-02-14 ENCOUNTER — PATIENT MESSAGE (OUTPATIENT)
Dept: OBSTETRICS AND GYNECOLOGY | Facility: CLINIC | Age: 50
End: 2025-02-14
Payer: COMMERCIAL

## 2025-02-27 NOTE — PROGRESS NOTES
"GYN Visit    Chief Complaint   Patient presents with    Gynecologic Exam     Black spot around the vaginal opening   Possible prolapse        HPI:   49 y.o. LMP: No LMP recorded. Patient has had a hysterectomy.     Social History     Substance and Sexual Activity   Sexual Activity Yes    Partners: Male    Birth control/protection: Hysterectomy    Comment: VARGAS/BSO , Dr. Cuevas benign indications     Progress Notes by Liliana Rocha DO (2024 10:15)    Prior VARGAS/BSO , Dr. Cuevas for benign indications.    History of VAIN 1-no Pap smears needed per GYN oncology.    History of VERN 2-3 s/p partial vulvectomy, GYN ONC.  Has completed colposcopic follow-up.  Follow-up only as needed for any noted lesions.  Pt has not noted any lesions.    Noted black spot in vagina on back wall and now it has gone away.   Concerned bladder falling.  Leak w cough laugh sneeze.      History: PMHx, Meds, Allergies, PSHx, Social Hx, and POBHx all reviewed and updated.    PHYSICAL EXAM:  /75   Pulse 82   Ht 165.1 cm (65\")   Wt 78.9 kg (174 lb)   BMI 28.96 kg/m²   Facility age limit for growth %varinder is 20 years.   Chaperone present during breast and/or pelvic exam if performed.  General- NAD, alert and oriented, appropriate  Psych- Normal mood, good memory    External genitalia- Normal female, no lesions. Exam w pt using mirror.  No lesions seen.   Urethra/meatus- Normal, no masses, non tender  Bladder- Normal, no masses, non tender  Vagina- Normal, no atrophy, no lesions, no discharge   Prolapse : Cystocele to w/in 1cm of introitus, Rectocele mild, Paravaginal defect bilat equal mild.  No apex cuff prolapse noted  Cervix- Absent   Uterus- Absent     Lymphatic- No palpable groin nodes  Extremities- No edema, no cyanosis    Skin- No lesions, no rashes      ASSESSMENT AND PLAN:  Diagnoses and all orders for this visit:    1. Cystocele with bilat vag prolapse (Primary)  Comments:  Options expectant, kegels, PFPT, pessary, " urogyn.  Will do kegels at home    2. CHINEDU (stress urinary incontinence, female)    Reassurrance re no vag lesion seen.  Con't to monitor w self exams and RTO for any lesions noted.        Follow Up:  Return in about 4 months (around 6/28/2025) for WWE.          Liliana Rocha, DO  02/28/2025    [facility]  [facility] [place] [address]  Dept: [phone]  Dept Fax: [phone]  Loc: [phone]  Loc Fax: [phone]

## 2025-02-28 ENCOUNTER — OFFICE VISIT (OUTPATIENT)
Dept: OBSTETRICS AND GYNECOLOGY | Facility: CLINIC | Age: 50
End: 2025-02-28
Payer: COMMERCIAL

## 2025-02-28 VITALS
HEIGHT: 65 IN | DIASTOLIC BLOOD PRESSURE: 75 MMHG | SYSTOLIC BLOOD PRESSURE: 107 MMHG | BODY MASS INDEX: 28.99 KG/M2 | HEART RATE: 82 BPM | WEIGHT: 174 LBS

## 2025-02-28 DIAGNOSIS — N81.4 CYSTOCELE WITH PROLAPSE: Primary | ICD-10-CM

## 2025-02-28 DIAGNOSIS — N39.3 SUI (STRESS URINARY INCONTINENCE, FEMALE): ICD-10-CM

## 2025-02-28 RX ORDER — IBUPROFEN 200 MG
TABLET ORAL
COMMUNITY
Start: 2022-11-24

## 2025-06-02 DIAGNOSIS — R39.15 URINARY URGENCY: ICD-10-CM

## 2025-06-02 DIAGNOSIS — N39.46 MIXED STRESS AND URGE URINARY INCONTINENCE: ICD-10-CM

## 2025-06-02 RX ORDER — TOLTERODINE 2 MG/1
2 CAPSULE, EXTENDED RELEASE ORAL DAILY
Qty: 90 CAPSULE | Refills: 0 | Status: SHIPPED | OUTPATIENT
Start: 2025-06-02

## 2025-07-01 ENCOUNTER — TRANSCRIBE ORDERS (OUTPATIENT)
Dept: ADMINISTRATIVE | Facility: HOSPITAL | Age: 50
End: 2025-07-01
Payer: COMMERCIAL

## 2025-07-01 DIAGNOSIS — Z12.31 SCREENING MAMMOGRAM FOR BREAST CANCER: Primary | ICD-10-CM

## 2025-07-14 NOTE — PROGRESS NOTES
"Well Woman Visit    CC: Scheduled annual well gyn visit  Chief Complaint   Patient presents with    Gynecologic Exam       HPI:   49 y.o.   Social History     Substance and Sexual Activity   Sexual Activity Yes    Partners: Male    Birth control/protection: Hysterectomy    Comment: VARGAS/BSO , Dr. Cuevas benign indications     Colonoscopy   Mammo Screening Digital Tomosynthesis Bilateral With CAD (2024 15:17)  Progress Notes by Liliana Rocha DO (2024 10:15)    Prior VARGAS/BSPIPER , Dr. Cuevas for benign indications.  No Pap smears needed.       Hx VAIN 1-no Pap smears needed per GYN oncology.    Hx VERN 2-3 s/p partial vulvectomy, GYN ONC.  Completed colposcopic FU  Follow-up only as needed for any noted lesions.  Pt has not noted any lesions.     No VB.  No pelvic pain.      Nipples very sensitive, started end April when quit smoking.  Only w taking bra off and lasts an hour.  No masses.  No nipple dc.  No Fhx br ca.    PCP: does manage PMHx and preventative labs  History: PMHx, Meds, Allergies, PSHx, Social Hx, and POBHx all reviewed and updated.    PHYSICAL EXAM:  /79   Pulse 72   Ht 165.1 cm (65\")   Wt 79.4 kg (175 lb)   BMI 29.12 kg/m²  Not found.   Chaperone present during breast and/or pelvic exam if performed.  General- NAD, alert and oriented, appropriate  Psych- Normal mood, good memory  Neck- No masses, no thyroid enlargement  CV- Regular rhythm, no murmurs  Resp- CTA to bases, no wheezes  Abdomen- Soft, non distended, non tender, no masses    Breast left-  Bilaterally symmetrical, no masses, non tender, no nipple discharge, no axillary or supraclavicular nodes palpable.  Nipple tender, mild blanching/color changes consistent with possible Candida  Breast right- Bilaterally symmetrical, no masses, non tender, no nipple discharge, no axillary or supraclavicular nodes palpable. Nipple tender, mild blanching/color changes consistent with possible Candida      External genitalia- " Normal female, no lesions  Urethra/meatus- Normal, no masses, non tender  Bladder- Normal, no masses, non tender  Vagina- Normal, mild atrophy, no lesions, no discharge.    Prolapse : none noted   Cvx- Absent  Uterus- Absent  Adnexa- Absent  Anus/Rectum/Perineum- Normal appearance, no mass, good sphincter tone, no hemorrhoids, no prolapse    Lymphatic- No palpable neck, axillary, or groin nodes  Ext- No edema, no cyanosis    Skin- No lesions, no rashes, no acanthosis nigricans      ASSESSMENT and PLAN:    Diagnoses and all orders for this visit:    1. Well woman exam (Primary)  -     Mammo Screening Digital Tomosynthesis Bilateral With CAD; Future  -     DEXA Bone Density Axial; Future    2. Nipple tenderness  Comments:  suspect candida  Orders:  -     nystatin (MYCOSTATIN) [id number] UNIT/GM cream; Apply 1 Application topically to the appropriate area as directed 2 (Two) Times a Day for 10 days.  Dispense: 20 g; Refill: 0        Preventative:  BREAST HEALTH- Monthly self breast exam importance and how to reviewed. MMG and/or MRI (prn) reviewed per society guidelines and her individual history. Screen: Updated today  CERVICAL CANCER Screening- Reviewed current ASCCP guidelines for screening w and wo cotest HPV, age specific.  Screen: Not medically needed  COLON CANCER Screening- Reviewed current medical society guidelines and options.  Screen:  Already up to date  Importance of WEIGHT MANAGEMENT, nutrition, and exercise reviewed.  Ideal BMI discussed  BONE HEALTH- Reviewed current medical society guidelines and options for testing, calcium and vit D intake.  Weight bearing exercise.  DEXA: Updated today  VACCINATIONS Recommended: Covid vaccine, Flu vaccine annually, Tdap vaccine [id number], Zoster vaccine (49yo and older), Pneumococcal vaccine (49yo and older).  Importance discussed, risk being unvaccinated reviewed.  Questions answered  Smoking status- NON SMOKER/VAPER  Follow up PCP/Specialist PMHx and/or  Labs          She understands the importance of having any ordered tests to be performed in a timely fashion.  The risks of not performing them include, but are not limited to, advanced cancer stages, bone loss from osteoporosis and/or subsequent increase in morbidity and/or mortality.  She is encouraged to review her results online and/or contact or office if she has questions.     Follow Up:  Return if symptoms worsen or fail to improve AND 1year WWE.            Liliana Rocha,   07/15/2025    [facility] [place] [facility] [address] [facility]  [address] [place] [address] [address] [place] [address]  Dept: [phone]  Dept Fax: [phone]  Loc: [phone]  Loc Fax: [phone]

## 2025-07-15 ENCOUNTER — OFFICE VISIT (OUTPATIENT)
Dept: OBSTETRICS AND GYNECOLOGY | Age: 50
End: 2025-07-15
Payer: COMMERCIAL

## 2025-07-15 VITALS
HEIGHT: 65 IN | DIASTOLIC BLOOD PRESSURE: 79 MMHG | SYSTOLIC BLOOD PRESSURE: 123 MMHG | BODY MASS INDEX: 29.16 KG/M2 | WEIGHT: 175 LBS | HEART RATE: 72 BPM

## 2025-07-15 DIAGNOSIS — N64.4 NIPPLE TENDERNESS: ICD-10-CM

## 2025-07-15 DIAGNOSIS — Z01.419 WELL WOMAN EXAM: Primary | ICD-10-CM

## 2025-07-15 RX ORDER — DICLOFENAC SODIUM 75 MG/1
75 TABLET, DELAYED RELEASE ORAL
COMMUNITY
Start: 2025-05-08

## 2025-07-15 RX ORDER — BACLOFEN 10 MG/1
10 TABLET ORAL
COMMUNITY
Start: 2025-05-08

## 2025-07-15 RX ORDER — BUSPIRONE HYDROCHLORIDE 5 MG/1
5-10 TABLET ORAL
COMMUNITY
Start: 2025-04-10

## 2025-07-15 RX ORDER — NYSTATIN 100000 U/G
1 CREAM TOPICAL 2 TIMES DAILY
Qty: 20 G | Refills: 0 | Status: SHIPPED | OUTPATIENT
Start: 2025-07-15 | End: 2025-07-25

## 2025-07-15 RX ORDER — BUPROPION HYDROCHLORIDE 300 MG/1
300 TABLET ORAL DAILY
COMMUNITY
Start: 2025-05-08

## 2025-08-11 DIAGNOSIS — B00.9 HERPES: ICD-10-CM

## 2025-08-11 DIAGNOSIS — N39.46 MIXED STRESS AND URGE URINARY INCONTINENCE: ICD-10-CM

## 2025-08-11 DIAGNOSIS — Z79.890 HORMONE REPLACEMENT THERAPY (HRT): ICD-10-CM

## 2025-08-11 DIAGNOSIS — R39.15 URINARY URGENCY: ICD-10-CM

## 2025-08-11 RX ORDER — ESTROGENS, CONJUGATED 0.9 MG/1
0.9 TABLET, FILM COATED ORAL DAILY
Qty: 90 TABLET | Refills: 4 | Status: SHIPPED | OUTPATIENT
Start: 2025-08-11

## 2025-08-11 RX ORDER — TOLTERODINE 2 MG/1
2 CAPSULE, EXTENDED RELEASE ORAL DAILY
Qty: 90 CAPSULE | Refills: 0 | Status: SHIPPED | OUTPATIENT
Start: 2025-08-11

## 2025-08-11 RX ORDER — VALACYCLOVIR HYDROCHLORIDE 1 G/1
1000 TABLET, FILM COATED ORAL DAILY
Qty: 90 TABLET | Refills: 4 | Status: SHIPPED | OUTPATIENT
Start: 2025-08-11

## 2025-08-27 ENCOUNTER — HOSPITAL ENCOUNTER (OUTPATIENT)
Dept: BONE DENSITY | Facility: HOSPITAL | Age: 50
Discharge: HOME | End: 2025-08-27
Admitting: OBSTETRICS & GYNECOLOGY
Payer: COMMERCIAL

## 2025-08-27 ENCOUNTER — HOSPITAL ENCOUNTER (OUTPATIENT)
Dept: MAMMOGRAPHY | Facility: HOSPITAL | Age: 50
Discharge: HOME | End: 2025-08-27
Admitting: OBSTETRICS & GYNECOLOGY
Payer: COMMERCIAL

## 2025-08-27 DIAGNOSIS — Z12.31 SCREENING MAMMOGRAM FOR BREAST CANCER: ICD-10-CM

## 2025-08-27 DIAGNOSIS — Z01.419 WELL WOMAN EXAM: ICD-10-CM

## 2025-08-27 PROCEDURE — 77080 DXA BONE DENSITY AXIAL: CPT

## 2025-08-27 PROCEDURE — 77067 SCR MAMMO BI INCL CAD: CPT

## 2025-08-27 PROCEDURE — 77063 BREAST TOMOSYNTHESIS BI: CPT

## 2025-08-28 ENCOUNTER — APPOINTMENT (OUTPATIENT)
Dept: BONE DENSITY | Facility: HOSPITAL | Age: 50
End: 2025-08-28
Payer: COMMERCIAL

## (undated) DEVICE — STERILE POLYISOPRENE POWDER-FREE SURGICAL GLOVES WITH EMOLLIENT COATING: Brand: PROTEXIS

## (undated) DEVICE — LINER SURG CANSTR SXN S/RIGD 1500CC

## (undated) DEVICE — CONN JET HYDRA H20 AUXILIARY DISP

## (undated) DEVICE — SOL IRRG H2O PL/BG 1000ML STRL

## (undated) DEVICE — SOLIDIFIER LIQLOC PLS 1500CC BT

## (undated) DEVICE — TUBING, SUCTION, 1/4" X 10', STRAIGHT: Brand: MEDLINE

## (undated) DEVICE — SINGLE-USE BIOPSY FORCEPS: Brand: RADIAL JAW 4

## (undated) DEVICE — Device

## (undated) DEVICE — SOL IRR H2O BTL 1000ML STRL

## (undated) DEVICE — Device: Brand: DEFENDO AIR/WATER/SUCTION AND BIOPSY VALVE

## (undated) DEVICE — STERILE POLYISOPRENE POWDER-FREE SURGICAL GLOVES: Brand: PROTEXIS